# Patient Record
Sex: FEMALE | Race: WHITE | Employment: FULL TIME | URBAN - METROPOLITAN AREA
[De-identification: names, ages, dates, MRNs, and addresses within clinical notes are randomized per-mention and may not be internally consistent; named-entity substitution may affect disease eponyms.]

---

## 2018-09-26 ENCOUNTER — APPOINTMENT (OUTPATIENT)
Dept: GENERAL RADIOLOGY | Age: 48
End: 2018-09-26
Attending: EMERGENCY MEDICINE
Payer: OTHER MISCELLANEOUS

## 2018-09-26 ENCOUNTER — APPOINTMENT (OUTPATIENT)
Dept: CT IMAGING | Age: 48
End: 2018-09-26
Attending: EMERGENCY MEDICINE
Payer: OTHER MISCELLANEOUS

## 2018-09-26 ENCOUNTER — HOSPITAL ENCOUNTER (EMERGENCY)
Age: 48
Discharge: HOME | End: 2018-09-26
Attending: EMERGENCY MEDICINE | Admitting: EMERGENCY MEDICINE
Payer: OTHER MISCELLANEOUS

## 2018-09-26 VITALS
TEMPERATURE: 98.2 F | DIASTOLIC BLOOD PRESSURE: 88 MMHG | BODY MASS INDEX: 28.04 KG/M2 | HEART RATE: 70 BPM | WEIGHT: 185 LBS | RESPIRATION RATE: 16 BRPM | HEIGHT: 68 IN | SYSTOLIC BLOOD PRESSURE: 147 MMHG | OXYGEN SATURATION: 99 %

## 2018-09-26 DIAGNOSIS — S22.32XA CLOSED FRACTURE OF ONE RIB OF LEFT SIDE, INITIAL ENCOUNTER: ICD-10-CM
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
## 2018-09-26 PROCEDURE — 73030 X-RAY EXAM OF SHOULDER: CPT

## 2018-09-26 PROCEDURE — 74011250636 HC RX REV CODE- 250/636: Performed by: EMERGENCY MEDICINE

## 2018-09-26 PROCEDURE — 72125 CT NECK SPINE W/O DYE: CPT

## 2018-09-26 PROCEDURE — 71045 X-RAY EXAM CHEST 1 VIEW: CPT

## 2018-09-26 PROCEDURE — 71100 X-RAY EXAM RIBS UNI 2 VIEWS: CPT

## 2018-09-26 PROCEDURE — 96372 THER/PROPH/DIAG INJ SC/IM: CPT

## 2018-09-26 PROCEDURE — 99284 EMERGENCY DEPT VISIT MOD MDM: CPT

## 2018-09-26 PROCEDURE — 73501 X-RAY EXAM HIP UNI 1 VIEW: CPT

## 2018-09-26 PROCEDURE — 72100 X-RAY EXAM L-S SPINE 2/3 VWS: CPT

## 2018-09-26 PROCEDURE — 77030027138 HC INCENT SPIROMETER -A

## 2018-09-26 PROCEDURE — 74011250637 HC RX REV CODE- 250/637: Performed by: EMERGENCY MEDICINE

## 2018-09-26 RX ORDER — HYDROCODONE BITARTRATE AND ACETAMINOPHEN 5; 325 MG/1; MG/1
1 TABLET ORAL
Qty: 20 TAB | Refills: 0 | Status: SHIPPED | OUTPATIENT
Start: 2018-09-26

## 2018-09-26 RX ORDER — CYCLOBENZAPRINE HCL 10 MG
10 TABLET ORAL
Status: COMPLETED | OUTPATIENT
Start: 2018-09-26 | End: 2018-09-26

## 2018-09-26 RX ORDER — ACETAMINOPHEN 325 MG/1
650 TABLET ORAL
Status: COMPLETED | OUTPATIENT
Start: 2018-09-26 | End: 2018-09-26

## 2018-09-26 RX ORDER — MORPHINE SULFATE 10 MG/ML
8 INJECTION, SOLUTION INTRAMUSCULAR; INTRAVENOUS
Status: COMPLETED | OUTPATIENT
Start: 2018-09-26 | End: 2018-09-26

## 2018-09-26 RX ADMIN — CYCLOBENZAPRINE HYDROCHLORIDE 10 MG: 10 TABLET, FILM COATED ORAL at 16:09

## 2018-09-26 RX ADMIN — ACETAMINOPHEN 650 MG: 325 TABLET ORAL at 16:09

## 2018-09-26 RX ADMIN — MORPHINE SULFATE 8 MG: 10 INJECTION INTRAVENOUS at 17:56

## 2018-09-26 NOTE — ED PROVIDER NOTES
EMERGENCY DEPARTMENT HISTORY AND PHYSICAL EXAM 
 
 
Date: 9/26/2018 Patient Name: [facility] History of Presenting Illness Chief Complaint Patient presents with  Fall GLF today, pt slipped on wet floor, denies any LOC, now on backboard and c collar; FSBS 245  Rib Pain Pain under left rib radiating to right side after fall today; worse with deep breathing  Shoulder Pain Pain at left shoulder after fall today, pain radiating to left back and neck, also numbness to left hand, good neuro vascular to left hand  Back Pain Pain at left low back after fall today History Provided By: Patient HPI: [facility], 46 y.o. female with PMHx significant for DM, HTN, presents via EMS to the ED with cc of moderate left-sided rib pain, left shoulder pain, left hip pain, neck pain, and left sided back pain s/p GLF PTA. She states she was getting onto an elevator when she slipped on the wet floor. Pt expresses she landed on her left side. She states neck pain is exacerbated by turning her head. Pt does not remember if she hit her head. She denies LOC. There are no other complaints, changes, or physical findings at this time. PCP: Jessica Davey MD 
 
 
 
Past History Past Medical History: 
Past Medical History:  
Diagnosis Date  Diabetes ([address]  Hypertension  Ill-defined condition Hypothyroid Past Surgical History: 
History reviewed. No pertinent surgical history. Family History: 
History reviewed. No pertinent family history. Social History: 
Social History Substance Use Topics  Smoking status: Never Smoker  Smokeless tobacco: Never Used  Alcohol use No  
 
 
Allergies: Allergies Allergen Reactions  Ibuprofen Other (comments) HTN  
 Nsaids (Non-Steroidal Anti-Inflammatory Drug) Other (comments) HTN Review of Systems Review of Systems Constitutional: Negative for chills and fever. Respiratory: Negative for cough and shortness of breath. Cardiovascular: Negative for chest pain. Gastrointestinal: Negative for constipation, diarrhea, nausea and vomiting. Musculoskeletal: Positive for arthralgias (left shoulder), back pain (left sided), myalgias (left sided ribs) and neck pain. Neurological: Negative for weakness and numbness. All other systems reviewed and are negative. Physical Exam  
Physical Exam  
Constitutional: She is oriented to person, place, and time. She appears well-developed. Overweight. On backboard with C-collar. HENT:  
Head: Normocephalic and atraumatic. Eyes: EOM are normal. Pupils are equal, round, and reactive to light. Neck: Normal range of motion. Cardiovascular: Normal rate and regular rhythm. Pulmonary/Chest: Effort normal and breath sounds normal.  
Tenderness left chest wall. Abdominal: Soft. She exhibits no distension. There is no tenderness. Musculoskeletal: Normal range of motion. She exhibits no deformity. 5/5 strength in Bilateral upper and lower extremities. Tender over lower cervical spine. Minimal tenderness over lumbar spine. Pain in left hip, over bilateral shoulders. Neurological: She is alert and oriented to person, place, and time. Skin: Skin is warm and dry. No abrasions or lacerations Psychiatric: She has a normal mood and affect. Diagnostic Study Results Radiologic Studies -  
XR HIP LT W OR WO PELV  1 VW Final Result XR SPINE LUMB 2 OR 3 V Final Result XR RIBS LT UNI 2 V Final Result XR RIBS RT UNI 2 V Final Result XR CHEST SNGL V Final Result XR SHOULDER RT AP/LAT MIN 2 V Final Result XR SHOULDER LT AP/LAT MIN 2 V Final Result CT SPINE CERV WO CONT Final Result XR SPINE LUMB 2 OR 3 V (Final result) Result time: 09/26/18 17:56:34  
  Final result by Christopher Perry Results In (09/26/18 17:53:56)  
  Initial Result:  
  Impression:   IMPRESSION: No acute findings.  
  Narrative:  
  EXAM:  XR SPINE LUMB 2 OR 3 V 
 
INDICATION:   Back Pain COMPARISON: None. FINDINGS: AP, lateral and spot lateral views of the lumbar spine demonstrate 
normal alignment.  Bilateral posterior bala and screw hardware at L5-S1 and 
intervertebral disc prostheses at the L4-5 and L5-S1 levels are noted. The 
vertebral body heights and disc spaces are well-preserved.  There is no 
fracture, subluxation or other abnormality.  
  
    
    
  XR SHOULDER RT AP/LAT MIN 2 V (Final result) Result time: 09/26/18 17:47:50  
  Final result by [place], Rad Results In (09/26/18 17:46:29)  
  Initial Result:  
  Impression:  
  IMPRESSION:  No acute abnormality. 
  
  Narrative:  
  EXAM:  XR SHOULDER LT AP/LAT MIN 2 V, XR SHOULDER RT AP/LAT MIN 2 V 
 
INDICATION:   Trauma.  Ground level fall today after patient slipped on wet 
floor with shoulder pain and pain rating to left back and neck and numbness left 
hand. COMPARISON: None. FINDINGS: Three views of the right and left shoulder demonstrate no fracture, 
dislocation or other acute abnormality. A calcified granuloma is noted in the 
left upper lobe lung. 
  
    
    
  XR SHOULDER LT AP/LAT MIN 2 V (Final result) Result time: 09/26/18 17:47:50  
  Final result by Orlando, Rad Results In (09/26/18 17:46:29)  
  Initial Result:  
  Impression:  
  IMPRESSION:  No acute abnormality. 
  
  Narrative:  
  EXAM:  XR SHOULDER LT AP/LAT MIN 2 V, XR SHOULDER RT AP/LAT MIN 2 V 
 
INDICATION:   Trauma.  Ground level fall today after patient slipped on wet 
floor with shoulder pain and pain rating to left back and neck and numbness left 
hand. COMPARISON: None. FINDINGS: Three views of the right and left shoulder demonstrate no fracture, 
dislocation or other acute abnormality.  A calcified granuloma is noted in the 
left upper lobe lung. 
  
    
    
  XR RIBS LT UNI 2 V (Final result) Result time: 09/26/18 17:53:35  
   Final result by Orlando, Rad Results In (09/26/18 17:47:57)  
  Initial Result:  
  Impression:  
  IMPRESSION: Non-displaced posterolateral left seventy rib fracture.  
  Narrative:  
  EXAM:  XR CHEST SNGL V, XR RIBS LT UNI 2 V, XR RIBS RT UNI 2 V 
 
INDICATION:  Chest Pain trauma COMPARISON: None. FINDINGS: A frontal radiograph of the chest and 3 oblique views of both ribs 
demonstrate a non-displaced fracture of the posterolateral left seventh rib. There is no pneumothorax or pleural effusion. Lungs are clear. Cardiomediastinal 
silhouette is unremarkable. 
  
    
    
  XR RIBS RT UNI 2 V (Final result) Result time: 09/26/18 17:53:35  
  Final result by Orlando, Rad Results In (09/26/18 17:47:57)  
  Initial Result:  
  Impression:  
  IMPRESSION: Non-displaced posterolateral left seventy rib fracture.  
  Narrative:  
  EXAM:  XR CHEST SNGL V, XR RIBS LT UNI 2 V, XR RIBS RT UNI 2 V 
 
INDICATION:  Chest Pain trauma COMPARISON: None. FINDINGS: A frontal radiograph of the chest and 3 oblique views of both ribs 
demonstrate a non-displaced fracture of the posterolateral left seventh rib. There is no pneumothorax or pleural effusion. Lungs are clear. Cardiomediastinal 
silhouette is unremarkable. 
  
    
    
  XR HIP LT W OR WO PELV  1 VW (Final result) Result time: 09/26/18 18:12:24  
  Final result by [place], Rad Results In (09/26/18 18:11:42)  
  Initial Result:  
  Impression:  
  IMPRESSION:  No acute abnormality. 
  
  Narrative:  
  EXAM:  XR HIP LT W OR WO PELV  1 VW INDICATION:   Left hip pain status post ground level fall today after slipping on wet floor. Deann Riser COMPARISON: None. FINDINGS: A single view of the left hip demonstrates no fracture, dislocation or 
other acute abnormality.  Lower lumbar fixation hardware and intervertebral disc 
prostheses are noted.    
 
 
 
CT Results  (Last 48 hours) 09/26/18 1635  CT SPINE CERV WO CONT Final result Impression:  IMPRESSION:   No evidence of acute fracture. Narrative:  EXAM:  CT SPINE CERV WO CONT INDICATION:  neck pain after trauma COMPARISON: None. TECHNIQUE:   Unenhanced multislice helical CT of the cervical spine was  
performed in the axial plane. Coronal and sagittal reconstructions were  
obtained. CT dose reduction was achieved through use of a standardized protocol  
tailored for this examination and automatic exposure control for dose  
modulation. FINDINGS:  
   
Alignment is within normal limits. Vertebral body heights are preserved without  
evidence of acute fracture. Spinal canal and neural foramina are patent. There  
is mild multilevel left-sided facet arthropathy. Visualized soft tissues of the  
neck are unremarkable. Visualized lung apices are clear. CXR Results  (Last 48 hours) 09/26/18 1736  XR CHEST SNGL V Final result Impression:  IMPRESSION: Non-displaced posterolateral left seventy rib fracture. Narrative:  EXAM:  XR CHEST SNGL V, XR RIBS LT UNI 2 V, XR RIBS RT UNI 2 V  
   
INDICATION:  Chest Pain trauma COMPARISON: None. FINDINGS: A frontal radiograph of the chest and 3 oblique views of both ribs  
demonstrate a non-displaced fracture of the posterolateral left seventh rib. There is no pneumothorax or pleural effusion. Lungs are clear. Cardiomediastinal  
silhouette is unremarkable. Medical Decision Making I am the first provider for this patient. I reviewed the vital signs, available nursing notes, past medical history, past surgical history, family history and social history. Vital Signs-Reviewed the patient's vital signs. Patient Vitals for the past 12 hrs: 
 Temp Pulse Resp BP SpO2  
09/26/18 1548 98.2 °F (36.8 °C) 70 16 147/88 99 % Records Reviewed: Nursing Notes and Old Medical Records Provider Notes (Medical Decision Making):  
 Patient presents after fall with left shoulder, left ribs, left hip, and neck pain pain. Will get imaging to further evaluate for fracture vs. Dislocation vs. Contusion. Will treat with analgesics at this time and continue to monitor for changes in mentation. ED Course:  
Initial assessment performed. The patients presenting problems have been discussed, and they are in agreement with the care plan formulated and outlined with them. I have encouraged them to ask questions as they arise throughout their visit. 5:41 PM 
Pt states she is still in a lot of pain. 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 Pt feels likes naproxen hasnt touched it. Will give shot of morphine and reassess. Disposition: 
Discharge Note: 
6:41 PM 
The pt is ready for discharge. The pt's signs, symptoms, diagnosis, and discharge instructions have been discussed and pt has conveyed their understanding. The pt is to follow up as recommended or return to ER should their symptoms worsen. Plan has been discussed and pt is in agreement. PLAN: 
1. Discharge Medication List as of 9/26/2018  6:35 PM  
  
START taking these medications Details HYDROcodone-acetaminophen (NORCO) 5-325 mg per tablet Take 1 Tab by mouth every six (6) hours as needed for Pain. Max Daily Amount: 4 Tabs., Print, Disp-20 Tab, R-0  
  
  
 
2. Follow-up Information Follow up With Details Comments Contact Info Jessica Davey MD  As needed Patient can only remember the practice name and not the physician Return to ED if worse Diagnosis Clinical Impression: 1. Fall from ground level 2. Closed fracture of one rib of left side, initial encounter Attestations:  
 
This note is prepared by Song Rondon, acting as a Scribe for MD Stefani Manzano MD: The scribe's documentation has been prepared under my direction and personally reviewed by me in its entirety. I confirm that the notes above accurately reflects all work, treatment, procedures, and medical decision making performed by me.

## 2018-09-26 NOTE — ED NOTES
Robie Blizzard, MD reviewed discharge instructions with the patient. The patient verbalized understanding. Ambulatory on discharge. Provided pt with IS on discharge

## 2018-09-26 NOTE — LETTER
[address] 
[facility] EMERGENCY DEPT 
[address] [id number] 
[phone] Work/School Note Date: 9/26/2018 To Whom It May concern: 
 
Sherren Span was seen and treated today in the emergency room by the following provider(s): 
Attending Provider: Stefani Starks MD. Sherren Span may return to work on 9/27/18. She has a close 7th rib fracture and should not be lifting anything above 5lbs for next 1 week. Sincerely, Stefani Starks MD